# Patient Record
(demographics unavailable — no encounter records)

---

## 2024-10-28 NOTE — PROCEDURE
[FreeTextEntry3] :  A Large joint injection was performed of the [RIGHT KNEE]. The indication for this procedure was pain and inflammation, and patient had decreased mobility in the joint. Risks, benefits and alternatives to a steroid injection procedure were discussed; Risks outlined include but are not limited to infection, sepsis, bleeding, scarring, skin discoloration, temporary increase in pain, syncopal episode, failure to resolve symptoms, allergic reaction, symptom recurrence, and elevation of blood sugar in diabetics.. All questions were answered to the patient's apparent satisfaction and informed consent obtained. Prior to injection a 'Time Out' was conducted in accordance with [facility] policy and the site and nature of procedure verified with the patient.    Procedure: The area of injection was prepared in a sterile fashion. The injection and aspiration was carried out utilizing sterile technique. The site was prepped with alcohol, betadine, ethyl chloride sprayed topically and sterile technique used.  50 cc normal appearing synovial fluid aspirated.   ( X ) 1cc of Celestone(30mg/ml)  ( X ) 2cc of 1% Lidocaine   Patient tolerated the procedure well and direct pressure was applied for hemostasis. The patient was reminded of potential post-injection risks including, but not limited to, delayed hypersensitivity reactions and/or infection. Ice tonight to the injection site.

## 2024-10-28 NOTE — HISTORY OF PRESENT ILLNESS
[Work related] : work related [Sudden] : sudden [Full time] : Work status: full time [10] : 10 [8] : 8 [Localized] : localized [Shooting] : shooting [Stabbing] : stabbing [Throbbing] : throbbing [Sleep] : sleep [Nothing helps with pain getting better] : Nothing helps with pain getting better [Lying in bed] : lying in bed [de-identified] : 10/28/2024: Patient presents with persistent bilateral knee pain (R>L). States swelling in the right knee has been worse recently.   05/09/2024: 53 y/o F with B/L knee problem. R>L.  [] : no [FreeTextEntry1] : knees/rt knee worse than the left  [FreeTextEntry3] : 2-3-19 [FreeTextEntry5] : Patient states she got her foot caught in a pile of clothes at work and fell on her hands and knees. Pain is worse in the right knee. 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

## 2024-10-28 NOTE — ASSESSMENT
[FreeTextEntry1] : Underlying pathology reviewed and treatment options discussed. 05/09/2024: x-rays, views, reveal moderate to severe tricompartmental global varus OA.  We discussed the findings of arthritis and the potential treatment options including CSI, visco injections, and PT. She has some fluid in her right knee, aspiration with cortisone planned today- tolerated well. Start PT and HEP to improve mechanics and reduce pain. Will obtain auth for euflexxa series for b/l knees.  Activity modification as tolerated. Questions addressed.  10/28/2024: B/L knee moderate to severe tricompartmental global varus OA.  treatment options discussed. Activity modification as tolerated. We discussed risk and benefits of Asp/CSI. Patient elected to proceed with asp/steroid injection today - R Knee tolerated well. Ice if sore. The r/b/a of future visco injections were discussed. Will request auth for B/L visco. Questions addressed. Follow up when auth approved.  The documentation recorded by the scribe accurately reflects the service I personally performed and the decisions made by me. I, Aaron Marsh, attest that this documentation has been prepared under the direction and in the presence of Provider Carmine Loja MD.   The patient was seen by Carmine Loja MD.

## 2024-11-25 NOTE — ASSESSMENT
[FreeTextEntry1] : Underlying pathology reviewed and treatment options discussed. 05/09/2024: x-rays, views, reveal moderate to severe tricompartmental global varus OA.  We discussed the findings of arthritis and the potential treatment options including CSI, visco injections, and PT. She has some fluid in her right knee, aspiration with cortisone planned today- tolerated well. Start PT and HEP to improve mechanics and reduce pain. Will obtain auth for euflexxa series for b/l knees.  Activity modification as tolerated. Questions addressed.  10/28/2024: B/L knee moderate to severe tricompartmental global varus OA.  treatment options discussed. Activity modification as tolerated. We discussed risk and benefits of Asp/CSI. Patient elected to proceed with asp/steroid injection today - R Knee tolerated well. Ice if sore. The r/b/a of future visco injections were discussed. Will request auth for B/L visco. Questions addressed. Follow up when auth approved.  11/25/2024: B/L knees Euflexxa #1. Ice if sore. Questions addressed. RTO 1 week to continue.  Progress note completed by NIKOLAI Valerio under the direct supervision of Carmine Loja M.D.

## 2024-11-25 NOTE — HISTORY OF PRESENT ILLNESS
[Work related] : work related [Sudden] : sudden [10] : 10 [8] : 8 [Localized] : localized [Shooting] : shooting [Stabbing] : stabbing [Throbbing] : throbbing [Sleep] : sleep [Nothing helps with pain getting better] : Nothing helps with pain getting better [Lying in bed] : lying in bed [Full time] : Work status: full time [de-identified] : 11/25/2024: B/L knees Euflexxa #1. Symptoms persist. Auth has been approved.  10/28/2024: Patient presents with persistent bilateral knee pain (R>L). States swelling in the right knee has been worse recently.   05/09/2024: 55 y/o F with B/L knee problem. R>L.  [] : no [FreeTextEntry1] : knees/rt knee worse than the left  [FreeTextEntry3] : 2-3-19 [FreeTextEntry5] : Patient states she got her foot caught in a pile of clothes at work and fell on her hands and knees. Pain is worse in the right knee. 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

## 2024-11-25 NOTE — PROCEDURE
[FreeTextEntry3] :  Viscosupplementation Injection: X-ray evidence of osteoarthritis on this or prior visit and patient has tried OTC's including aspirin, Ibuprofen, Aleve etc or prescription NSAIDS, and/or exercises at home and/ or physical therapy without satisfactory response.   An injection of Euflexxa 2.5ml #1 was injected into the bilateral knees after verbal consent obtained.   64cc normal appearing synovial fluid aspirated from the right knee.  Patient has tried OTC's including aspirin, Ibuprofen, Aleve etc or prescription NSAIDS, and/or exercises at home and/ or physical therapy without satisfactory response and Patient has decreased mobility in the joint. The risks, benefits, and alternatives to cortisone injection were explained in full to the patient. Risks outlined include but are not limited to infection, sepsis, bleeding, scarring, skin discoloration, temporary increase in pain, syncopal episode, failure to resolve symptoms, allergic reaction, and symptom recurrence. Patient understands the risks. All questions were answered. After discussion of options, patient requested an injection. Oral informed consent was obtained. Sterile technique was utilized for the procedure including the preparation of the solutions used for the injection. Injection site was prepped with betadine and alcohol. Ethyl chloride sprayed topically. Sterile technique used. Patient tolerated procedure well.   Post Procedure Instructions: Patient was advised to call if redness, pain, or fever occur. Apply ice for 15 min. every 2 hours for the next 12-24 hours as tolerated. Patient was advised to rest the joint(s) for 1-2 days.

## 2024-12-03 NOTE — ASSESSMENT
[FreeTextEntry1] : Underlying pathology reviewed and treatment options discussed. 05/09/2024: x-rays, views, reveal moderate to severe tricompartmental global varus OA.  We discussed the findings of arthritis and the potential treatment options including CSI, visco injections, and PT. She has some fluid in her right knee, aspiration with cortisone planned today- tolerated well. Start PT and HEP to improve mechanics and reduce pain. Will obtain auth for euflexxa series for b/l knees.  Activity modification as tolerated. Questions addressed.  10/28/2024: B/L knee moderate to severe tricompartmental global varus OA.  treatment options discussed. Activity modification as tolerated. We discussed risk and benefits of Asp/CSI. Patient elected to proceed with asp/steroid injection today - R Knee tolerated well. Ice if sore. The r/b/a of future visco injections were discussed. Will request auth for B/L visco. Questions addressed. Follow up when auth approved.  11/25/2024: B/L knees Euflexxa #1. Ice if sore. Questions addressed. RTO 1 week to continue.  12/03/2024: B/L knees Euflexxa #2. Ice if sore. Questions addressed. RTO 1 week to continue.  Progress note completed by NIKOLAI Valerio under the direct supervision of Carmine Loja M.D.

## 2024-12-03 NOTE — PROCEDURE
[FreeTextEntry3] :  Viscosupplementation Injection: X-ray evidence of osteoarthritis on this or prior visit and patient has tried OTC's including aspirin, Ibuprofen, Aleve etc or prescription NSAIDS, and/or exercises at home and/ or physical therapy without satisfactory response.   74 cc normal appearing synovial fluid aspirated.  An injection of Euflexxa 2.5ml #2 was injected into the bilateral knees after verbal consent obtained.   64cc normal appearing synovial fluid aspirated from the right knee.  Patient has tried OTC's including aspirin, Ibuprofen, Aleve etc or prescription NSAIDS, and/or exercises at home and/ or physical therapy without satisfactory response and Patient has decreased mobility in the joint. The risks, benefits, and alternatives to cortisone injection were explained in full to the patient. Risks outlined include but are not limited to infection, sepsis, bleeding, scarring, skin discoloration, temporary increase in pain, syncopal episode, failure to resolve symptoms, allergic reaction, and symptom recurrence. Patient understands the risks. All questions were answered. After discussion of options, patient requested an injection. Oral informed consent was obtained. Sterile technique was utilized for the procedure including the preparation of the solutions used for the injection. Injection site was prepped with betadine and alcohol. Ethyl chloride sprayed topically. Sterile technique used. Patient tolerated procedure well.   Post Procedure Instructions: Patient was advised to call if redness, pain, or fever occur. Apply ice for 15 min. every 2 hours for the next 12-24 hours as tolerated. Patient was advised to rest the joint(s) for 1-2 days.

## 2024-12-03 NOTE — HISTORY OF PRESENT ILLNESS
[Work related] : work related [Sudden] : sudden [10] : 10 [8] : 8 [Localized] : localized [Shooting] : shooting [Stabbing] : stabbing [Throbbing] : throbbing [Sleep] : sleep [Nothing helps with pain getting better] : Nothing helps with pain getting better [Lying in bed] : lying in bed [Full time] : Work status: full time [de-identified] : 12/03/2024: B/L knees Euflexxa #2. Symptoms persist. Denies complications with prior injections.  11/25/2024: B/L knees Euflexxa #1. Symptoms persist. Auth has been approved.  10/28/2024: Patient presents with persistent bilateral knee pain (R>L). States swelling in the right knee has been worse recently.   05/09/2024: 55 y/o F with B/L knee problem. R>L.  [] : no [FreeTextEntry1] : knees/rt knee worse than the left  [FreeTextEntry3] : 2-3-19 [FreeTextEntry5] : Patient states she got her foot caught in a pile of clothes at work and fell on her hands and knees. Pain is worse in the right knee. 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

## 2024-12-10 NOTE — ASSESSMENT
Detail Level: Detailed [FreeTextEntry1] : Underlying pathology reviewed and treatment options discussed. 05/09/2024: x-rays, views, reveal moderate to severe tricompartmental global varus OA.  We discussed the findings of arthritis and the potential treatment options including CSI, visco injections, and PT. She has some fluid in her right knee, aspiration with cortisone planned today- tolerated well. Start PT and HEP to improve mechanics and reduce pain. Will obtain auth for euflexxa series for b/l knees.  Activity modification as tolerated. Questions addressed.  10/28/2024: B/L knee moderate to severe tricompartmental global varus OA.  treatment options discussed. Activity modification as tolerated. We discussed risk and benefits of Asp/CSI. Patient elected to proceed with asp/steroid injection today - R Knee tolerated well. Ice if sore. The r/b/a of future visco injections were discussed. Will request auth for B/L visco. Questions addressed. Follow up when auth approved.  11/25/2024: B/L knees Euflexxa #1. Ice if sore. Questions addressed. RTO 1 week to continue.  12/03/2024: B/L knees Euflexxa #2. Ice if sore. Questions addressed. RTO 1 week to continue.  12/10/2024: B/L knees Euflexxa #3. Ice if sore. Rx for Mobic - proper use reviewed. Recommend additional regimen of PT due to persistent pain and swelling. Questions addressed. RTO 6 weeks.  Progress note completed by NIKOLAI Valerio under the direct supervision of Carmine Loja M.D.

## 2024-12-10 NOTE — HISTORY OF PRESENT ILLNESS
[Work related] : work related [Sudden] : sudden [10] : 10 [8] : 8 [Localized] : localized [Shooting] : shooting [Stabbing] : stabbing [Throbbing] : throbbing [Sleep] : sleep [Nothing helps with pain getting better] : Nothing helps with pain getting better [Lying in bed] : lying in bed [Full time] : Work status: full time [de-identified] : 12/10/2024: B/L knees Euflexxa #3. Symptoms persist. Denies complications with prior injections.  12/03/2024: B/L knees Euflexxa #2. Symptoms persist. Denies complications with prior injections.  11/25/2024: B/L knees Euflexxa #1. Symptoms persist. Auth has been approved.  10/28/2024: Patient presents with persistent bilateral knee pain (R>L). States swelling in the right knee has been worse recently.   05/09/2024: 53 y/o F with B/L knee problem. R>L.  [] : no [FreeTextEntry1] : knees/rt knee worse than the left  [FreeTextEntry3] : 2-3-19 [FreeTextEntry5] : Patient states she got her foot caught in a pile of clothes at work and fell on her hands and knees. Pain is worse in the right knee. 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

## 2024-12-10 NOTE — PROCEDURE
[FreeTextEntry3] :  Viscosupplementation Injection: X-ray evidence of osteoarthritis on this or prior visit and patient has tried OTC's including aspirin, Ibuprofen, Aleve etc or prescription NSAIDS, and/or exercises at home and/ or physical therapy without satisfactory response.   74 cc normal appearing synovial fluid aspirated.  An injection of Euflexxa 2.5ml #3 was injected into the bilateral knees after verbal consent obtained.   27cc normal appearing synovial fluid aspirated from the right knee.  Patient has tried OTC's including aspirin, Ibuprofen, Aleve etc or prescription NSAIDS, and/or exercises at home and/ or physical therapy without satisfactory response and Patient has decreased mobility in the joint. The risks, benefits, and alternatives to cortisone injection were explained in full to the patient. Risks outlined include but are not limited to infection, sepsis, bleeding, scarring, skin discoloration, temporary increase in pain, syncopal episode, failure to resolve symptoms, allergic reaction, and symptom recurrence. Patient understands the risks. All questions were answered. After discussion of options, patient requested an injection. Oral informed consent was obtained. Sterile technique was utilized for the procedure including the preparation of the solutions used for the injection. Injection site was prepped with betadine and alcohol. Ethyl chloride sprayed topically. Sterile technique used. Patient tolerated procedure well.   Post Procedure Instructions: Patient was advised to call if redness, pain, or fever occur. Apply ice for 15 min. every 2 hours for the next 12-24 hours as tolerated. Patient was advised to rest the joint(s) for 1-2 days.

## 2025-01-22 NOTE — PHYSICAL EXAM
[Chaperone Present] : A chaperone was present in the examining room during all aspects of the physical examination [[id number]] : A chaperone was present during the pelvic exam. [Appropriately responsive] : appropriately responsive [Alert] : alert [No Acute Distress] : no acute distress [No Lymphadenopathy] : no lymphadenopathy [Regular Rate Rhythm] : regular rate rhythm [No Murmurs] : no murmurs [Clear to Auscultation B/L] : clear to auscultation bilaterally [Soft] : soft [Non-tender] : non-tender [Non-distended] : non-distended [No HSM] : No HSM [No Lesions] : no lesions [No Mass] : no mass [Oriented x3] : oriented x3 [Examination Of The Breasts] : a normal appearance [] : implants [No Masses] : no breast masses were palpable [Labia Majora] : normal [Labia Minora] : normal [Normal] : normal [Uterine Adnexae] : normal

## 2025-01-22 NOTE — HISTORY OF PRESENT ILLNESS
[FreeTextEntry1] : pt presents for annual doing well. c/o frequent, severe hot flashes. had normal mammo this month

## 2025-02-06 NOTE — HISTORY OF PRESENT ILLNESS
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    She describes severe menopausal symptoms and would like to start estrogen--her gynecologist has requested input from me regarding initiation of therapy.  She describes severe and frequent hot flashes.  She continues to do well from a cardiovascular standpoint with a good functional status and well-controlled hypertension..  She has no cardiac symptoms.  * This visit was conducted as part of ongoing, longitudinal medical care for patient's chronic medical diagnoses and other issues.

## 2025-02-06 NOTE — PHYSICAL EXAM
[Normal S1, S2] : normal S1, S2 [Clear Lung Fields] : clear lung fields [Normal Gait] : normal gait [No Edema] : no edema [Alert and Oriented] : alert and oriented [No Acute Distress] : no acute distress [Good Air Entry] : good air entry [de-identified] : Extraocular muscles intact [de-identified] : No JVD

## 2025-02-06 NOTE — REVIEW OF SYSTEMS
[SOB] : no shortness of breath [Chest Discomfort] : no chest discomfort [Palpitations] : no palpitations [FreeTextEntry2] : Hot flashes

## 2025-02-06 NOTE — DISCUSSION/SUMMARY
[FreeTextEntry1] :  Hypertension: Controlled; continue lisinopril and hydrochlorothiazide.  First-degree AV block: Today's ECG has a decreased [place] interval and is otherwise normal.  * Mrs. Awad has no history of heart disease, normal ECG, and well-controlled hypertension.  She is aware of potential adverse reactions from estrogen therapy but describes severe menopausal symptoms and would like to start therapy -- I have no cardiac contraindication to its initiation.

## 2025-03-31 NOTE — ASSESSMENT
[FreeTextEntry1] : Underlying pathology reviewed and treatment options discussed. 05/09/2024: x-rays, views, reveal moderate to severe tricompartmental global varus OA.  We discussed the findings of arthritis and the potential treatment options including CSI, visco injections, and PT. She has some fluid in her right knee, aspiration with cortisone planned today- tolerated well. Start PT and HEP to improve mechanics and reduce pain. Will obtain auth for euflexxa series for b/l knees.  Activity modification as tolerated. Questions addressed.  10/28/2024: B/L knee moderate to severe tricompartmental global varus OA.  treatment options discussed. Activity modification as tolerated. We discussed risk and benefits of Asp/CSI. Patient elected to proceed with asp/steroid injection today - R Knee tolerated well. Ice if sore. The r/b/a of future visco injections were discussed. Will request auth for B/L visco. Questions addressed. Follow up when auth approved.  11/25/2024: B/L knees Euflexxa #1. Ice if sore. Questions addressed. RTO 1 week to continue.  12/03/2024: B/L knees Euflexxa #2. Ice if sore. Questions addressed. RTO 1 week to continue.  12/10/2024: B/L knees Euflexxa #3. Ice if sore. Rx for Mobic - proper use reviewed. Recommend additional regimen of PT due to persistent pain and swelling. Questions addressed. RTO 6 weeks.  3/31/25: f/up danica knees. Repeat danica knee CSIs today, jennifer well. Will provide her with new PT rx. f/up 6 weeks

## 2025-03-31 NOTE — PROCEDURE
[FreeTextEntry3] : A Large joint injection was performed of the [BILATERAL KNEE]. The indication for this procedure was pain and inflammation, and patient had decreased mobility in the joint. Risks, benefits and alternatives to a steroid injection procedure were discussed; Risks outlined include but are not limited to infection, sepsis, bleeding, scarring, skin discoloration, temporary increase in pain, syncopal episode, failure to resolve symptoms, allergic reaction, symptom recurrence, and elevation of blood sugar in diabetics.. All questions were answered to the patient's apparent satisfaction and informed consent obtained. Prior to injection a 'Time Out' was conducted in accordance with [facility] policy and the site and nature of procedure verified with the patient.    Procedure: The area of injection was prepared in a sterile fashion. The injection and aspiration was carried out utilizing sterile technique. The site was prepped with alcohol, betadine, ethyl chloride sprayed topically and sterile technique used.  90 cc normal appearing synovial fluid aspirated FROM THE RIGHT KNEE.   ( X ) 1cc of Celestone(30mg/ml)  ( X ) 2cc of 1% Lidocaine   Patient tolerated the procedure well and direct pressure was applied for hemostasis. The patient was reminded of potential post-injection risks including, but not limited to, delayed hypersensitivity reactions and/or infection. Ice tonight to the injection site.

## 2025-03-31 NOTE — HISTORY OF PRESENT ILLNESS
[Work related] : work related [Sudden] : sudden [10] : 10 [8] : 8 [Localized] : localized [Shooting] : shooting [Stabbing] : stabbing [Throbbing] : throbbing [Sleep] : sleep [Nothing helps with pain getting better] : Nothing helps with pain getting better [Lying in bed] : lying in bed [Full time] : Work status: full time [de-identified] : 3/31/25: f/up danica knees. Had mild relief with Euflexxa series. Now having worsening pain over the past month   12/10/2024: B/L knees Euflexxa #3. Symptoms persist. Denies complications with prior injections.  12/03/2024: B/L knees Euflexxa #2. Symptoms persist. Denies complications with prior injections.  11/25/2024: B/L knees Euflexxa #1. Symptoms persist. Auth has been approved.  10/28/2024: Patient presents with persistent bilateral knee pain (R>L). States swelling in the right knee has been worse recently.   05/09/2024: 53 y/o F with B/L knee problem. R>L.  [] : no [FreeTextEntry1] : knees/rt knee worse than the left  [FreeTextEntry3] : 2-3-19 [FreeTextEntry5] : Patient states she got her foot caught in a pile of clothes at work and fell on her hands and knees. Pain is worse in the right knee. 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